# Patient Record
Sex: MALE | Race: WHITE | NOT HISPANIC OR LATINO | Employment: FULL TIME | URBAN - METROPOLITAN AREA
[De-identification: names, ages, dates, MRNs, and addresses within clinical notes are randomized per-mention and may not be internally consistent; named-entity substitution may affect disease eponyms.]

---

## 2017-09-09 ENCOUNTER — OFFICE VISIT (OUTPATIENT)
Dept: FAMILY MEDICINE | Facility: CLINIC | Age: 47
End: 2017-09-09
Payer: COMMERCIAL

## 2017-09-09 VITALS
HEIGHT: 62 IN | HEART RATE: 76 BPM | OXYGEN SATURATION: 98 % | TEMPERATURE: 99 F | SYSTOLIC BLOOD PRESSURE: 118 MMHG | WEIGHT: 158.19 LBS | BODY MASS INDEX: 29.11 KG/M2 | DIASTOLIC BLOOD PRESSURE: 70 MMHG

## 2017-09-09 DIAGNOSIS — G25.0 ESSENTIAL TREMOR: ICD-10-CM

## 2017-09-09 PROCEDURE — 99999 PR PBB SHADOW E&M-EST. PATIENT-LVL III: CPT | Mod: PBBFAC,,, | Performed by: NURSE PRACTITIONER

## 2017-09-09 PROCEDURE — 3008F BODY MASS INDEX DOCD: CPT | Mod: S$GLB,,, | Performed by: NURSE PRACTITIONER

## 2017-09-09 PROCEDURE — 99214 OFFICE O/P EST MOD 30 MIN: CPT | Mod: S$GLB,,, | Performed by: NURSE PRACTITIONER

## 2017-09-09 RX ORDER — PROPRANOLOL HYDROCHLORIDE 20 MG/1
TABLET ORAL
Qty: 90 TABLET | Refills: 0 | Status: SHIPPED | OUTPATIENT
Start: 2017-09-09 | End: 2017-09-22

## 2017-09-09 NOTE — PROGRESS NOTES
Subjective:       Patient ID: Randall Armstrong is a 47 y.o. male.    Chief Complaint: Tremors    HPI Mr Armstrong is here for B hand tremors.  This is a chronic problem, treated with Propanolol in the past with good relief, d/c'd medication when he lost his insurance.  Reports recent stressors r/t work and family.  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  He drinks on the weekends, he notes the tremors resolve after 3 beers.  Denies specifically dropping objects, but reports trouble holding objects due to tremors.  Review of Systems   Constitutional: Negative for fever.   Respiratory: Negative.    Cardiovascular: Negative.    Neurological: Positive for tremors.       Objective:      Physical Exam   Constitutional: He is oriented to person, place, and time. He appears well-developed and well-nourished. He does not appear ill. No distress.   HENT:   Head: Normocephalic and atraumatic.   Cardiovascular: Normal rate.    Pulses:       Radial pulses are 2+ on the right side, and 2+ on the left side.   Pulmonary/Chest: Effort normal.   Musculoskeletal: Normal range of motion.        Right hand: He exhibits normal range of motion and normal capillary refill. Normal sensation noted. Normal strength noted.        Left hand: He exhibits normal range of motion and normal capillary refill. Normal sensation noted. Normal strength noted.   Able to make a fist and a 5   Neurological: He is alert and oriented to person, place, and time.   Skin: Skin is warm and dry.   Vitals reviewed.      Assessment:       1. Essential tremor        Plan:       Essential tremor  -     propranolol (INDERAL) 20 MG tablet; Take 2 tablets in the morning and one at night  Dispense: 90 tablet; Refill: 0    Will restart propanolol, follow up made with PCP  Verbalized understanding

## 2017-09-09 NOTE — PATIENT INSTRUCTIONS
Follow up with primary care provider  Go to ER for new worse or concerning symptoms    Essential Tremor (ET)  What is essential tremor?  Essential tremor (ET) is a neurological disorder that causes your hands, head, trunk, voice, or legs to shake rhythmically. It is often confused with Parkinson disease.  ET is the most common trembling disorder that people have. Everyone has some ET, but the movements usually cannot be seen or felt. When tremors are noticeable, the condition is classified as ET.  ET is most common among people older than age 65, but it can affect people at any age.  What causes ET?  ET can occur in different people for different reasons:  · Familial essential tremor. In most people, the condition seems to be passed down from a parent to a child. If your parent has ET, there is a 50% chance that you or your children will inherit the gene responsible for the condition.  · Essential tremor related to another disorder. Sometimes, a tremor is a symptom of another neurological disorder, such as Parkinson disease or dystonia. Sometimes, ET is mistaken for these other diseases when they are not present. A healthcare providers careful diagnosis is extremely important.  The cause of ET isnt known. However, one theory suggests that your cerebellum and other parts of your brain are not communicating correctly. The cerebellum is a part of the brain that controls muscle coordination.  What are the symptoms of ET?  If you have ET, you will have shaking and trembling at different times and in different situations, but some characteristics are common to all. Here is what you might typically experience:  · Tremors occur when you move and are less noticeable when you rest.  · Certain medicines, caffeine, or stress can make your tremors worse.  · Tremor may improve with ingestion of a small amount of alcohol (such as wine)  · Tremors get worse as you age.  · Tremors dont affect both sides of your body in the same  way.  Here are different signs of essential tremor:  · Tremors that are most obvious in your hands  · Difficulty doing tasks with your hands, such as writing or using tools  · Shaking or quivering sound in your voice  · Uncontrollable head-nodding  · In rare instances, tremors in your legs or feet  How is ET diagnosed?  Your rapid, uncontrollable trembling, as well as questions about your medical and family history, can help your healthcare provider determine if you have familial ET. He or she will probably need to rule out other conditions that could cause shaking or trembling. For example, tremors could be symptoms of diseases, such as hyperthyroidism. Your healthcare provider might test you for those, as well.  In some cases, the tremors might be related to other factors. To find out for certain, your healthcare provider may have you try to:  · Abstain from heavy alcohol use; if youre an alcoholic, trembling is a common symptom.  · Cut out cigarette smoking.  · Avoid caffeine.  · Avoid certain medicines  How is ET treated?   Propanolol and primidone are 2 medicines often prescribed to treat ET. Propanolol blocks the stimulating action of neurotransmitters to calm your trembling. Primidone is a common antiseizure medicine that also controls the actions of neurotransmitters.  Gabapentin and topiramate are 2 other antiseizure medicines that are sometimes prescribed. In some cases, tranquilizers like alprazolam or clonazepam might be suggested.  For ET in your hands, botulinum toxin (Botox) injections have shown some promise in easing the trembling. They work by weakening the surrounding muscles around your hands. For severe tremors, a stimulating device (deep brain stimulator) surgically implanted in your brain may help.  Can ET be prevented?   The specific cause of ET is not known, so scientists are not sure how the condition can be prevented.  Living with ET  ET is usually not dangerous, but it can certainly be  frustrating if you have to deal with it. Certain factors can make tremors worse, so the following steps may help to decrease tremors:  · Avoid alcohol, cigarettes, and caffeine  · Avoid stressful situations as much as possible  · Use relaxation techniques, such as yoga, deep-breathing exercises, or biofeedback  · Check with your healthcare provider to see if any medicines youre taking could be making your tremors worse.  Talk with your healthcare provider about other options, such as surgery, if ET starts to affect your quality of life.  When should I call my healthcare provider?  If you have been diagnosed with ET, talk with your healthcare provider about when you might need to call. He or she will likely advise you to call if your tremors become worse, or if you develop new neurologic symptoms, such as numbness or weakness.  Key points about essential tremors  · ET is a neurological disorder that causes your hands, head, trunk, voice, or legs to shake rhythmically. The cause is not known, but it is often passed down from a parent to a child.  · ET is sometimes confused with other types of tremor, so getting the right diagnosis is important.  · Tremors tend to be worse during movement than when at rest. The tremors are usually not dangerous, but they can get worse over time.  · Avoiding things that might make tremors worse, such as stress, caffeine, and certain medicines, may be helpful.  · Medicines can also help control or limit tremors in some people. Severe tremors can sometimes be treated with surgery.  Next steps  Tips to help you get the most from a visit to your healthcare provider:  · Know the reason for your visit and what you want to happen.  · Before your visit, write down questions you want answered.  · Bring someone with you to help you ask questions and remember what your provider tells you.  · At the visit, write down the name of a new diagnosis, and any new medicines, treatments, or tests. Also  write down any new instructions your provider gives you.  · Know why a new medicine or treatment is prescribed, and how it will help you. Also know what the side effects are.  · Ask if your condition can be treated in other ways.  · Know why a test or procedure is recommended and what the results could mean.  · Know what to expect if you do not take the medicine or have the test or procedure.  · If you have a follow-up appointment, write down the date, time, and purpose for that visit.  · Know how you can contact your provider if you have questions.  © 3119-3182 [employer]. [address] [place] [address], [place] [address]. All rights reserved. This information is not intended as a substitute for professional medical care. Always follow your healthcare professional's instructions.

## 2017-09-22 ENCOUNTER — OFFICE VISIT (OUTPATIENT)
Dept: FAMILY MEDICINE | Facility: CLINIC | Age: 47
End: 2017-09-22
Payer: COMMERCIAL

## 2017-09-22 VITALS
OXYGEN SATURATION: 98 % | DIASTOLIC BLOOD PRESSURE: 74 MMHG | BODY MASS INDEX: 29.23 KG/M2 | SYSTOLIC BLOOD PRESSURE: 112 MMHG | WEIGHT: 159.81 LBS | HEART RATE: 64 BPM | RESPIRATION RATE: 18 BRPM | TEMPERATURE: 98 F

## 2017-09-22 DIAGNOSIS — F41.9 ANXIETY: ICD-10-CM

## 2017-09-22 DIAGNOSIS — G25.0 BENIGN ESSENTIAL TREMOR: Primary | ICD-10-CM

## 2017-09-22 PROCEDURE — 99999 PR PBB SHADOW E&M-EST. PATIENT-LVL III: CPT | Mod: PBBFAC,,, | Performed by: FAMILY MEDICINE

## 2017-09-22 PROCEDURE — 3008F BODY MASS INDEX DOCD: CPT | Mod: S$GLB,,, | Performed by: FAMILY MEDICINE

## 2017-09-22 PROCEDURE — 99214 OFFICE O/P EST MOD 30 MIN: CPT | Mod: S$GLB,,, | Performed by: FAMILY MEDICINE

## 2017-09-22 RX ORDER — ESCITALOPRAM OXALATE 5 MG/1
5 TABLET ORAL DAILY
Qty: 30 TABLET | Refills: 1 | Status: SHIPPED | OUTPATIENT
Start: 2017-09-22 | End: 2017-09-29

## 2017-09-22 RX ORDER — PROPRANOLOL HYDROCHLORIDE 80 MG/1
80 CAPSULE, EXTENDED RELEASE ORAL DAILY
Qty: 30 CAPSULE | Refills: 11 | Status: SHIPPED | OUTPATIENT
Start: 2017-09-22 | End: 2017-12-01

## 2017-09-27 ENCOUNTER — TELEPHONE (OUTPATIENT)
Dept: FAMILY MEDICINE | Facility: CLINIC | Age: 47
End: 2017-09-27

## 2017-09-27 NOTE — TELEPHONE ENCOUNTER
Please check with patient to see if tremors are worse all day or do they progressively worsen throughout the day    Thanks,  Dr. Blair

## 2017-09-27 NOTE — TELEPHONE ENCOUNTER
----- Message from Darcy Oliveira sent at 9/27/2017  1:28 PM CDT -----  Contact: SELF  Patient is calling to report his shaking is worse .    [phone] FD

## 2017-09-28 ENCOUNTER — TELEPHONE (OUTPATIENT)
Dept: FAMILY MEDICINE | Facility: CLINIC | Age: 47
End: 2017-09-28

## 2017-09-28 NOTE — TELEPHONE ENCOUNTER
Pt states he takes his meds @ 5:30 in the morning and around lunch time he starts to shake he states it's his whole body but his main concern is his hands because he uses them for work and it goes on until about 5:30 when he gets home.

## 2017-09-29 NOTE — PROGRESS NOTES
Routine Office Visit    Patient Name: Randall Armstrong    : 1970  MRN: [id number]    Subjective:  Randall is a 47 y.o. male who presents today for:    1. Tremor  Patient presenting today with continued essential tremor.  He has been taking more than prescribed of his propanolol.  He states that the 80mg he is taking is helping and he is able to do his work.  There has been no weakness or changes in walking.  He states that overall he feels fine, but is anxious about how this will effect his job as a .  He states that his family depends on his income and this causes him great anxiety.  He admits to feeling down on occasion, but relates it to being worried about finances.  He denies any SI or HI.      Past Medical History  History reviewed. No pertinent past medical history.    Past Surgical History  History reviewed. No pertinent surgical history.    Family History  History reviewed. No pertinent family history.    Social History  Social History     Social History    Marital status:      Spouse name: N/A    Number of children: N/A    Years of education: N/A     Occupational History    Not on file.     Social History Main Topics    Smoking status: Never Smoker    Smokeless tobacco: Never Used    Alcohol use Yes    Drug use: Unknown    Sexual activity: Not on file     Other Topics Concern    Not on file     Social History Narrative    No narrative on file       Current Medications  Current Outpatient Prescriptions on File Prior to Visit   Medication Sig Dispense Refill    fluticasone (FLONASE) 50 mcg/actuation nasal spray 1 spray by Each Nare route 2 (two) times daily. 16 g 1     No current facility-administered medications on file prior to visit.        Allergies   Review of patient's allergies indicates:  No Known Allergies    Review of Systems (Pertinent positives)  Review of Systems   Constitutional: Negative.    HENT: Negative.    Eyes: Negative.    Respiratory: Negative.     Cardiovascular: Negative.    Skin: Negative.    Neurological: Positive for tremors. Negative for focal weakness.   Psychiatric/Behavioral: The patient is nervous/anxious.          /74   Pulse 64   Temp 97.6 °F (36.4 °C)   Resp 18   Wt 72.5 kg (159 lb 13.3 oz)   SpO2 98%   BMI 29.23 kg/m²     GENERAL APPEARANCE: in no apparent distress and well developed and well nourished  HEENT: PERRL, EOMI, Sclera clear, anicteric, Oropharynx clear, no lesions, Neck supple with midline trachea  NECK: normal, supple, no adenopathy, thyroid normal in size  RESPIRATORY: appears well, vitals normal, no respiratory distress, acyanotic, normal RR, chest clear, no wheezing, crepitations, rhonchi, normal symmetric air entry  HEART: regular rate and rhythm, S1, S2 normal, no murmur, click, rub or gallop.    ABDOMEN: abdomen is soft without tenderness, no masses, no hernias, no organomegaly, no rebound, no guarding. Suprapubic tenderness absent. No CVA tenderness.  NEUROLOGIC: normal without focal findings, essential tremor in bilateral hands; no pill rolling; no cogwheeling  SKIN: no rashes, no wounds, no other lesions  PSYCH: Alert, oriented x 3, thought content appropriate, speech normal, pleasant and cooperative, good eye contact, well groomed.    Assessment/Plan:  Randall Armstrong is a 47 y.o. male who presents today for :    Diagnoses and all orders for this visit:    Benign essential tremor  -     propranolol (INDERAL LA) 80 MG 24 hr capsule; Take 1 capsule (80 mg total) by mouth once daily.    Anxiety  -     escitalopram oxalate (LEXAPRO) 5 MG Tab; Take 1 tablet (5 mg total) by mouth once daily.      1.  Patient to take medications as prescribed  2.  He is to let me know if symptoms worsen  3.  Follow up in 3 months or sooner if needed  4.  He is to call with any concerns    Iglesia Blair MD

## 2017-10-04 ENCOUNTER — HOSPITAL ENCOUNTER (OUTPATIENT)
Facility: HOSPITAL | Age: 47
Discharge: HOME | End: 2017-10-05
Attending: EMERGENCY MEDICINE | Admitting: EMERGENCY MEDICINE
Payer: COMMERCIAL

## 2017-10-04 ENCOUNTER — NURSE TRIAGE (OUTPATIENT)
Dept: ADMINISTRATIVE | Facility: CLINIC | Age: 47
End: 2017-10-04

## 2017-10-04 DIAGNOSIS — G25.0 BENIGN ESSENTIAL TREMOR: ICD-10-CM

## 2017-10-04 DIAGNOSIS — E78.2 MIXED HYPERLIPIDEMIA: ICD-10-CM

## 2017-10-04 DIAGNOSIS — M25.512 ACUTE PAIN OF LEFT SHOULDER: ICD-10-CM

## 2017-10-04 DIAGNOSIS — R07.9 CHEST PAIN, UNSPECIFIED TYPE: Primary | ICD-10-CM

## 2017-10-04 DIAGNOSIS — R07.9 CHEST PAIN: ICD-10-CM

## 2017-10-04 LAB
ALBUMIN SERPL BCP-MCNC: 4.2 G/DL
ALP SERPL-CCNC: 60 U/L
ALT SERPL W/O P-5'-P-CCNC: 24 U/L
ANION GAP SERPL CALC-SCNC: 9 MMOL/L
AST SERPL-CCNC: 24 U/L
BASOPHILS # BLD AUTO: 0.02 K/UL
BASOPHILS NFR BLD: 0.2 %
BILIRUB SERPL-MCNC: 0.6 MG/DL
BNP SERPL-MCNC: 33 PG/ML
BUN SERPL-MCNC: 14 MG/DL
CALCIUM SERPL-MCNC: 9.3 MG/DL
CHLORIDE SERPL-SCNC: 107 MMOL/L
CO2 SERPL-SCNC: 24 MMOL/L
CREAT SERPL-MCNC: 0.9 MG/DL
DIFFERENTIAL METHOD: ABNORMAL
EOSINOPHIL # BLD AUTO: 0.1 K/UL
EOSINOPHIL NFR BLD: 0.9 %
ERYTHROCYTE [DISTWIDTH] IN BLOOD BY AUTOMATED COUNT: 12.9 %
EST. GFR  (AFRICAN AMERICAN): >60 ML/MIN/1.73 M^2
EST. GFR  (NON AFRICAN AMERICAN): >60 ML/MIN/1.73 M^2
GLUCOSE SERPL-MCNC: 86 MG/DL
HCT VFR BLD AUTO: 38.8 %
HGB BLD-MCNC: 13.9 G/DL
LYMPHOCYTES # BLD AUTO: 2.7 K/UL
LYMPHOCYTES NFR BLD: 33.1 %
MCH RBC QN AUTO: 32.4 PG
MCHC RBC AUTO-ENTMCNC: 35.8 G/DL
MCV RBC AUTO: 90 FL
MONOCYTES # BLD AUTO: 0.5 K/UL
MONOCYTES NFR BLD: 6.6 %
NEUTROPHILS # BLD AUTO: 4.8 K/UL
NEUTROPHILS NFR BLD: 59.2 %
PLATELET # BLD AUTO: 235 K/UL
PMV BLD AUTO: 11.2 FL
POTASSIUM SERPL-SCNC: 3.9 MMOL/L
PROT SERPL-MCNC: 7.2 G/DL
RBC # BLD AUTO: 4.29 M/UL
SODIUM SERPL-SCNC: 140 MMOL/L
TROPONIN I SERPL DL<=0.01 NG/ML-MCNC: <0.006 NG/ML
TROPONIN I SERPL DL<=0.01 NG/ML-MCNC: <0.006 NG/ML
WBC # BLD AUTO: 8.07 K/UL

## 2017-10-04 PROCEDURE — 93005 ELECTROCARDIOGRAM TRACING: CPT

## 2017-10-04 PROCEDURE — 25000003 PHARM REV CODE 250: Performed by: EMERGENCY MEDICINE

## 2017-10-04 PROCEDURE — G0378 HOSPITAL OBSERVATION PER HR: HCPCS

## 2017-10-04 PROCEDURE — 84484 ASSAY OF TROPONIN QUANT: CPT

## 2017-10-04 PROCEDURE — 36000 PLACE NEEDLE IN VEIN: CPT

## 2017-10-04 PROCEDURE — 93010 ELECTROCARDIOGRAM REPORT: CPT | Mod: ,,, | Performed by: INTERNAL MEDICINE

## 2017-10-04 PROCEDURE — 83880 ASSAY OF NATRIURETIC PEPTIDE: CPT

## 2017-10-04 PROCEDURE — 80053 COMPREHEN METABOLIC PANEL: CPT

## 2017-10-04 PROCEDURE — 99284 EMERGENCY DEPT VISIT MOD MDM: CPT | Mod: 25

## 2017-10-04 PROCEDURE — 85025 COMPLETE CBC W/AUTO DIFF WBC: CPT

## 2017-10-04 RX ORDER — ONDANSETRON 2 MG/ML
4 INJECTION INTRAMUSCULAR; INTRAVENOUS EVERY 12 HOURS PRN
Status: DISCONTINUED | OUTPATIENT
Start: 2017-10-04 | End: 2017-10-05 | Stop reason: HOSPADM

## 2017-10-04 RX ORDER — AMOXICILLIN 250 MG
1 CAPSULE ORAL 2 TIMES DAILY
Status: DISCONTINUED | OUTPATIENT
Start: 2017-10-04 | End: 2017-10-05 | Stop reason: HOSPADM

## 2017-10-04 RX ORDER — ACETAMINOPHEN 325 MG/1
650 TABLET ORAL EVERY 8 HOURS PRN
Status: DISCONTINUED | OUTPATIENT
Start: 2017-10-04 | End: 2017-10-05 | Stop reason: HOSPADM

## 2017-10-04 RX ORDER — HYDROCODONE BITARTRATE AND ACETAMINOPHEN 10; 325 MG/1; MG/1
1 TABLET ORAL EVERY 4 HOURS PRN
Status: DISCONTINUED | OUTPATIENT
Start: 2017-10-04 | End: 2017-10-05 | Stop reason: HOSPADM

## 2017-10-04 RX ORDER — SODIUM CHLORIDE 0.9 % (FLUSH) 0.9 %
3 SYRINGE (ML) INJECTION EVERY 8 HOURS
Status: DISCONTINUED | OUTPATIENT
Start: 2017-10-04 | End: 2017-10-05 | Stop reason: HOSPADM

## 2017-10-04 RX ORDER — ASPIRIN 325 MG
325 TABLET ORAL DAILY
Status: DISCONTINUED | OUTPATIENT
Start: 2017-10-04 | End: 2017-10-04

## 2017-10-04 RX ORDER — KETOROLAC TROMETHAMINE 30 MG/ML
30 INJECTION, SOLUTION INTRAMUSCULAR; INTRAVENOUS EVERY 6 HOURS
Status: DISCONTINUED | OUTPATIENT
Start: 2017-10-05 | End: 2017-10-05 | Stop reason: HOSPADM

## 2017-10-04 RX ORDER — PROPRANOLOL HYDROCHLORIDE 80 MG/1
80 CAPSULE, EXTENDED RELEASE ORAL DAILY
Status: DISCONTINUED | OUTPATIENT
Start: 2017-10-05 | End: 2017-10-05 | Stop reason: HOSPADM

## 2017-10-04 RX ORDER — PANTOPRAZOLE SODIUM 40 MG/1
40 TABLET, DELAYED RELEASE ORAL DAILY
Status: DISCONTINUED | OUTPATIENT
Start: 2017-10-05 | End: 2017-10-05 | Stop reason: HOSPADM

## 2017-10-04 RX ORDER — ASPIRIN 325 MG
325 TABLET ORAL
Status: COMPLETED | OUTPATIENT
Start: 2017-10-04 | End: 2017-10-04

## 2017-10-04 RX ORDER — NAPROXEN SODIUM 220 MG/1
81 TABLET, FILM COATED ORAL DAILY
Status: DISCONTINUED | OUTPATIENT
Start: 2017-10-05 | End: 2017-10-05 | Stop reason: HOSPADM

## 2017-10-04 RX ORDER — KETOROLAC TROMETHAMINE 30 MG/ML
15 INJECTION, SOLUTION INTRAMUSCULAR; INTRAVENOUS EVERY 6 HOURS PRN
Status: DISCONTINUED | OUTPATIENT
Start: 2017-10-04 | End: 2017-10-04

## 2017-10-04 RX ADMIN — HYDROCODONE BITARTRATE AND ACETAMINOPHEN 1 TABLET: 10; 325 TABLET ORAL at 07:10

## 2017-10-04 RX ADMIN — ASPIRIN 325 MG ORAL TABLET 325 MG: 325 PILL ORAL at 01:10

## 2017-10-04 RX ADMIN — ASPIRIN 325 MG ORAL TABLET 325 MG: 325 PILL ORAL at 03:10

## 2017-10-04 NOTE — TELEPHONE ENCOUNTER
"    Reason for Disposition   Chest pain lasting longer than 5 minutes and ANY of the following:* Over 50 years old* Over 30 years old and at least one cardiac risk factor (i.e., high blood pressure, diabetes, high cholesterol, obesity, smoker or strong family history of heart disease)* Pain is crushing, pressure-like, or heavy * Took nitroglycerin and chest pain was not relieved* History of heart disease (i.e., angina, heart attack, bypass surgery, angioplasty, CHF)    Protocols used: ST CHEST PAIN-A-OH    Patient states that he started to feel pain in his chest at about 0930 this AM. He described the pain as feeling as though "someone was standing on his chest" and endorses radiation to the left arm and difficulty breathing during the episode. Patient states that he feels better now, but still has some pain. RN advised patient to call EMS, but patient stated that he will drive his own vehicle to the [facility] ED that is closest to him. I suggested that he have someone else take him. Patient verbalized understanding to care advice.  "

## 2017-10-04 NOTE — ED TRIAGE NOTES
Pt arrived to ED c/o chest pain rating as 5, describes as pressure while he was at work started this morning, pt took 2 pills of advil. Pt also reports SOB started this morning along with chest pain. Pt reports feeling headache yesterday and took 2 pills of advil. and pt  Pt is AAOx3, denies SOB, h/A  at this moment.

## 2017-10-04 NOTE — ASSESSMENT & PLAN NOTE
Admitted for rule out ACS.  Ischemic work-up (-) to date. EKG is non-ischemic. Initial troponin level negative. BNP wnl. Plan for continuous cardiac monitoring. Continue to trend serial troponins q6 hours X 2. ASA/NTG 0.4 mg SL PRN CP. NPO. Exercise nuclear stress. Cardiology consulted. ?poss MSK in mature.  Will provide NSAID trial x 3 doses.

## 2017-10-04 NOTE — SUBJECTIVE & OBJECTIVE
Past Medical History:   Diagnosis Date    Anxiety     Essential tremor        History reviewed. No pertinent surgical history.    Review of patient's allergies indicates:  No Known Allergies    No current facility-administered medications on file prior to encounter.      Current Outpatient Prescriptions on File Prior to Encounter   Medication Sig    propranolol (INDERAL LA) 80 MG 24 hr capsule Take 1 capsule (80 mg total) by mouth once daily.    fluticasone (FLONASE) 50 mcg/actuation nasal spray 1 spray by Each Nare route 2 (two) times daily.     Family History     Problem Relation (Age of Onset)    Cancer Mother    Heart disease Maternal Grandfather        Social History Main Topics    Smoking status: Never Smoker    Smokeless tobacco: Current User     Types: Chew    Alcohol use 3.0 - 3.6 oz/week     5 - 6 Cans of beer per week      Comment: on weekend only    Drug use: No    Sexual activity: Yes     Partners: Female     Review of Systems   Constitutional: Negative for chills, diaphoresis and fever.   HENT: Negative for congestion, postnasal drip, sinus pressure and sore throat.    Eyes: Negative for visual disturbance.   Respiratory: Negative for cough, chest tightness, shortness of breath and wheezing.    Cardiovascular: Positive for chest pain. Negative for palpitations and leg swelling.   Gastrointestinal: Negative for abdominal distention, abdominal pain, blood in stool, constipation, diarrhea, nausea and vomiting.   Endocrine: Negative.    Genitourinary: Negative for dysuria.   Musculoskeletal: Negative.         Left shoulder/arm pain   Skin: Negative.    Allergic/Immunologic: Negative.    Neurological: Negative for dizziness, weakness, numbness and headaches.   Hematological: Negative.    Psychiatric/Behavioral: Negative.      Objective:     Vital Signs (Most Recent):  Temp: 97.9 °F (36.6 °C) (10/04/17 1655)  Pulse: 72 (10/04/17 1655)  Resp: 18 (10/04/17 1655)  BP: 130/85 (10/04/17 1655)  SpO2: 97 %  (10/04/17 1655) Vital Signs (24h Range):  Temp:  [97.7 °F (36.5 °C)-98.4 °F (36.9 °C)] 97.9 °F (36.6 °C)  Pulse:  [54-72] 72  Resp:  [16-18] 18  SpO2:  [97 %-99 %] 97 %  BP: (115-131)/(78-87) 130/85     Weight: 75.1 kg (165 lb 9.1 oz)  Body mass index is 30.28 kg/m².    Physical Exam   Constitutional: He is oriented to person, place, and time. He appears well-developed and well-nourished. He is cooperative.  Non-toxic appearance. No distress.   HENT:   Head: Normocephalic and atraumatic.   Eyes: Conjunctivae and lids are normal. Pupils are equal, round, and reactive to light.   Neck: Trachea normal, normal range of motion and full passive range of motion without pain. Neck supple. Normal carotid pulses and no JVD present. No tracheal deviation present. No thyroid mass and no thyromegaly present.   Cardiovascular: Normal rate, regular rhythm, S1 normal, S2 normal, normal heart sounds and normal pulses.    Pulmonary/Chest: Effort normal and breath sounds normal. No stridor.   Abdominal: Soft. Normal appearance and bowel sounds are normal. There is no tenderness.   Musculoskeletal: Normal range of motion.   Neurological: He is alert and oriented to person, place, and time. He has normal strength. No cranial nerve deficit or sensory deficit.   Skin: Skin is warm, dry and intact. He is not diaphoretic. No cyanosis. Nails show no clubbing.   Psychiatric: He has a normal mood and affect. His speech is normal and behavior is normal. Judgment and thought content normal. Cognition and memory are normal.        Significant Labs:   CBC:   Recent Labs  Lab 10/04/17  1323   WBC 8.07   HGB 13.9*   HCT 38.8*        CMP:   Recent Labs  Lab 10/04/17  1323      K 3.9      CO2 24   GLU 86   BUN 14   CREATININE 0.9   CALCIUM 9.3   PROT 7.2   ALBUMIN 4.2   BILITOT 0.6   ALKPHOS 60   AST 24   ALT 24   ANIONGAP 9   EGFRNONAA >60     Cardiac Markers:   Recent Labs  Lab 10/04/17  1323   BNP 33     Troponin:   Recent  Labs  Lab 10/04/17  1323 10/04/17  1625   TROPONINI <0.006 <0.006     Significant Imaging:   Imaging Results          X-Ray Chest PA And Lateral (Final result)  Result time 10/04/17 13:35:50    Final result by Ten Downing MD (10/04/17 13:35:50)                 Impression:     No acute cardiopulmonary process.          Electronically signed by: TEN DOWNING MD  Date:     10/04/17  Time:    13:35              Narrative:    Chest PA and lateral    Indication:Chest pain    Comparison:None    Findings:  The cardiomediastinal silhouette is not enlarged.  There is no pleural effusion.  The trachea is midline.  The lungs are symmetrically expanded bilaterally with coarse interstitial attenuation. No large focal consolidation seen.  There is no pneumothorax.  The osseous structures are unremarkable.

## 2017-10-04 NOTE — HPI
"Randall Armstrong is a 47 y.o. male with a history as below who presented to the ED with a CC of mid-sternal chest pain with associated left shoulder numbness. He reports he went to work this AM, developed a generalized HA ~ 0900, took advil without relief. States ~ 0930 he developed mid-sternal "stabbing/feel like someone sitting in my chest" chest pain 5/10. Reports the pain is continuous however, "eases up".  He reports the left upper arm/shoulder pain he reports "feeling like someone is squeezing my arm". Does not know mother/father history. Maternal grandmother (d) heart failure.  Found in ED to have non-ischemic EKG.  Initial troponin negative. BNP wnl. CXR is non-revealing.  Admitted to [facility]BS for ACS rule out.  No previous cardiac issues or recent w/u. He denies recurrent CP and is non-distressed.                                      "

## 2017-10-04 NOTE — ED PROVIDER NOTES
"Encounter Date: 10/4/2017    SCRIBE #1 NOTE: I, Angelina Mendoza, am scribing for, and in the presence of,  Paul Leal MD. I have scribed the following portions of the note - Other sections scribed: HPI and ROS.       History     Chief Complaint   Patient presents with    Chest Pain     Patient c/o squeezing CP this morning while working this morning. Now c/o numbness in his left shoulder radiating down his left arm.      Chief Complaint: Chest Pain    HPI: This 47 y.o. Male with no known PMHx presents to the ED c/o acute onset mis sternal chest pain. Symptoms began while working this morning. Pain is constant, severe and described as a squeezing sensation. There's associated left shoulder "tightness" that radiates down to the left elbow. Patient also reports mild SOB. There's no attempted treatment. Symptoms have since improved since onset. He denies fever, chills, diaphoresis, nausea or vomiting.       The history is provided by the patient. No  was used.     Review of patient's allergies indicates:  No Known Allergies  Past Medical History:   Diagnosis Date    Anxiety     Essential tremor      History reviewed. No pertinent surgical history.  Family History   Problem Relation Age of Onset    Cancer Mother      breast    Heart disease Maternal Grandfather      Social History   Substance Use Topics    Smoking status: Never Smoker    Smokeless tobacco: Current User     Types: Chew    Alcohol use 3.0 - 3.6 oz/week     5 - 6 Cans of beer per week      Comment: on weekend only     Review of Systems   Constitutional: Negative for chills and fever.   HENT: Negative for ear pain and sore throat.    Eyes: Negative for pain.   Respiratory: Negative for cough and shortness of breath.    Cardiovascular: Positive for chest pain.   Gastrointestinal: Negative for abdominal pain, diarrhea, nausea and vomiting.   Genitourinary: Negative for dysuria and flank pain.   Musculoskeletal: Negative for " myalgias (arm or leg pain).   Skin: Negative for rash.   Neurological: Negative for dizziness, syncope, numbness and headaches.   Psychiatric/Behavioral: Negative for confusion.       Physical Exam     Initial Vitals [10/04/17 1147]   BP Pulse Resp Temp SpO2   129/86 60 16 97.8 °F (36.6 °C) 98 %      MAP       100.33         Physical Exam    Constitutional: He appears well-developed and well-nourished. He is not diaphoretic. No distress.   HENT:   Head: Normocephalic and atraumatic.   Eyes: Conjunctivae and EOM are normal. Pupils are equal, round, and reactive to light. No scleral icterus.   Neck: Normal range of motion. Neck supple.   Cardiovascular: Normal rate, regular rhythm, normal heart sounds and intact distal pulses. Exam reveals no gallop and no friction rub.    No murmur heard.  Pulmonary/Chest: Breath sounds normal. No stridor. No respiratory distress. He has no wheezes. He has no rhonchi. He has no rales.   Abdominal: Soft. Bowel sounds are normal. He exhibits no distension. There is no tenderness. There is no rebound and no guarding.   Musculoskeletal: Normal range of motion. He exhibits no edema or tenderness.   Neurological: He is alert and oriented to person, place, and time. He has normal strength. No cranial nerve deficit.   Skin: Skin is warm and dry. No rash noted.   Psychiatric: He has a normal mood and affect. His behavior is normal.         ED Course   Procedures  Labs Reviewed   CBC W/ AUTO DIFFERENTIAL - Abnormal; Notable for the following:        Result Value    RBC 4.29 (*)     Hemoglobin 13.9 (*)     Hematocrit 38.8 (*)     MCH 32.4 (*)     All other components within normal limits   COMPREHENSIVE METABOLIC PANEL   TROPONIN I   B-TYPE NATRIURETIC PEPTIDE   TROPONIN I     EKG Readings: (Independently Interpreted)   Initial Reading: No STEMI. Rhythm: Sinus Bradycardia. Heart Rate: 56.          Medical Decision Making:   Initial Assessment:   48 yo male with Hx concerning for ACS. Initial  trop, EKG, and exam reassuring. Also possible is GERD, gastritis, though larger concern would be for missed ACS. Will admit for obs and further evaluation and management including stress test and cards eval. Patient stable in Ed. Given ASA.             Scribe Attestation:   Scribe #1: I performed the above scribed service and the documentation accurately describes the services I performed. I attest to the accuracy of the note.    Attending Attestation:           Physician Attestation for Scribe:  Physician Attestation Statement for Scribe #1: I, Paul Leal MD, reviewed documentation, as scribed by Angelina Mendoza in my presence, and it is both accurate and complete.                 ED Course      Clinical Impression:   The primary encounter diagnosis was Chest pain, unspecified type. Diagnoses of Chest pain, Mixed hyperlipidemia, Benign essential tremor, and Acute pain of left shoulder were also pertinent to this visit.    Disposition:   Disposition: Placed in Observation  Condition: Stable                        Paul Leal MD  10/18/17 6889

## 2017-10-04 NOTE — H&P
"[facility] Hospital Medicine  History & Physical    Patient Name: Randall Armstrong  MRN: [id number]  Admission Date: 10/4/2017  Attending Physician: Davida Estrada MD   Primary Care Provider: Den Moore MD         Patient information was obtained from patient and ER records.     Subjective:     Principal Problem:<principal problem not specified>    Chief Complaint:   Chief Complaint   Patient presents with    Chest Pain     Patient c/o squeezing CP this morning while working this morning. Now c/o numbness in his left shoulder radiating down his left arm.         HPI: Randall Armstrong is a 47 y.o. male with a history as below who presented to the ED with a CC of mid-sternal chest pain with associated left shoulder numbness. He reports he went to work this AM, developed a generalized HA ~ 0900, took advil without relief. States ~ 0930 he developed mid-sternal "stabbing/feel like someone sitting in my chest" chest pain 5/10. Reports the pain is continuous however, "eases up".  He reports the left upper arm/shoulder pain he reports "feeling like someone is squeezing my arm". Does not know mother/father history. Maternal grandmother (d) heart failure.  Found in ED to have non-ischemic EKG.  Initial troponin negative. BNP wnl. CXR is non-revealing.  Admitted to OBS for ACS rule out.  No previous cardiac issues or recent w/u. He denies recurrent CP and is non-distressed.                                        Past Medical History:   Diagnosis Date    Anxiety     Essential tremor        History reviewed. No pertinent surgical history.    Review of patient's allergies indicates:  No Known Allergies    No current facility-administered medications on file prior to encounter.      Current Outpatient Prescriptions on File Prior to Encounter   Medication Sig    propranolol (INDERAL LA) 80 MG 24 hr capsule Take 1 capsule (80 mg total) by mouth once daily.    fluticasone (FLONASE) 50 mcg/actuation " nasal spray 1 spray by Each Nare route 2 (two) times daily.     Family History     Problem Relation (Age of Onset)    Cancer Mother    Heart disease Maternal Grandfather        Social History Main Topics    Smoking status: Never Smoker    Smokeless tobacco: Current User     Types: Chew    Alcohol use 3.0 - 3.6 oz/week     5 - 6 Cans of beer per week      Comment: on weekend only    Drug use: No    Sexual activity: Yes     Partners: Female     Review of Systems   Constitutional: Negative for chills, diaphoresis and fever.   HENT: Negative for congestion, postnasal drip, sinus pressure and sore throat.    Eyes: Negative for visual disturbance.   Respiratory: Negative for cough, chest tightness, shortness of breath and wheezing.    Cardiovascular: Positive for chest pain. Negative for palpitations and leg swelling.   Gastrointestinal: Negative for abdominal distention, abdominal pain, blood in stool, constipation, diarrhea, nausea and vomiting.   Endocrine: Negative.    Genitourinary: Negative for dysuria.   Musculoskeletal: Negative.         Left shoulder/arm pain   Skin: Negative.    Allergic/Immunologic: Negative.    Neurological: Negative for dizziness, weakness, numbness and headaches.   Hematological: Negative.    Psychiatric/Behavioral: Negative.      Objective:     Vital Signs (Most Recent):  Temp: 97.9 °F (36.6 °C) (10/04/17 1655)  Pulse: 72 (10/04/17 1655)  Resp: 18 (10/04/17 1655)  BP: 130/85 (10/04/17 1655)  SpO2: 97 % (10/04/17 1655) Vital Signs (24h Range):  Temp:  [97.7 °F (36.5 °C)-98.4 °F (36.9 °C)] 97.9 °F (36.6 °C)  Pulse:  [54-72] 72  Resp:  [16-18] 18  SpO2:  [97 %-99 %] 97 %  BP: (115-131)/(78-87) 130/85     Weight: 75.1 kg (165 lb 9.1 oz)  Body mass index is 30.28 kg/m².    Physical Exam   Constitutional: He is oriented to person, place, and time. He appears well-developed and well-nourished. He is cooperative.  Non-toxic appearance. No distress.   HENT:   Head: Normocephalic and atraumatic.    Eyes: Conjunctivae and lids are normal. Pupils are equal, round, and reactive to light.   Neck: Trachea normal, normal range of motion and full passive range of motion without pain. Neck supple. Normal carotid pulses and no JVD present. No tracheal deviation present. No thyroid mass and no thyromegaly present.   Cardiovascular: Normal rate, regular rhythm, S1 normal, S2 normal, normal heart sounds and normal pulses.    Pulmonary/Chest: Effort normal and breath sounds normal. No stridor.   Abdominal: Soft. Normal appearance and bowel sounds are normal. There is no tenderness.   Musculoskeletal: Normal range of motion.   Neurological: He is alert and oriented to person, place, and time. He has normal strength. No cranial nerve deficit or sensory deficit.   Skin: Skin is warm, dry and intact. He is not diaphoretic. No cyanosis. Nails show no clubbing.   Psychiatric: He has a normal mood and affect. His speech is normal and behavior is normal. Judgment and thought content normal. Cognition and memory are normal.        Significant Labs:   CBC:   Recent Labs  Lab 10/04/17  1323   WBC 8.07   HGB 13.9*   HCT 38.8*        CMP:   Recent Labs  Lab 10/04/17  1323      K 3.9      CO2 24   GLU 86   BUN 14   CREATININE 0.9   CALCIUM 9.3   PROT 7.2   ALBUMIN 4.2   BILITOT 0.6   ALKPHOS 60   AST 24   ALT 24   ANIONGAP 9   EGFRNONAA >60     Cardiac Markers:   Recent Labs  Lab 10/04/17  1323   BNP 33     Troponin:   Recent Labs  Lab 10/04/17  1323 10/04/17  1625   TROPONINI <0.006 <0.006     Significant Imaging:   Imaging Results          X-Ray Chest PA And Lateral (Final result)  Result time 10/04/17 13:35:50    Final result by Ten Downing MD (10/04/17 13:35:50)                 Impression:     No acute cardiopulmonary process.          Electronically signed by: TEN DOWNING MD  Date:     10/04/17  Time:    13:35              Narrative:    Chest PA and lateral    Indication:Chest  pain    Comparison:None    Findings:  The cardiomediastinal silhouette is not enlarged.  There is no pleural effusion.  The trachea is midline.  The lungs are symmetrically expanded bilaterally with coarse interstitial attenuation. No large focal consolidation seen.  There is no pneumothorax.  The osseous structures are unremarkable.                                Assessment/Plan:     Chest pain    Admitted for rule out ACS.  Ischemic work-up (-) to date. EKG is non-ischemic. Initial troponin level negative. BNP wnl. Plan for continuous cardiac monitoring. Continue to trend serial troponins q6 hours X 2. ASA/NTG 0.4 mg SL PRN CP. NPO. Exercise nuclear stress. Cardiology consulted. ?poss MSK in mature.  Will provide NSAID trial x 3 doses.            Benign essential tremor    Continue propanolol            VTE Risk Mitigation         Ordered     Low Risk of VTE  Once      10/04/17 1804             ONESIMO Xie  Department of Hospital Medicine   [facility]

## 2017-10-04 NOTE — NURSING
Pt arrive to the unit by wheelchair accompanied by ED staff. Assisted pt to bed. Tele monitoring initiated.  Admit assessment initiated.  Pt is AAOx4.  No distress noted.

## 2017-10-04 NOTE — ED NOTES
Pt requested for a tray. Dr. Leal notified and verbalized adult regular diet for pt. Dietary called and verbalized understanding.

## 2017-10-05 VITALS
RESPIRATION RATE: 16 BRPM | WEIGHT: 166 LBS | BODY MASS INDEX: 30.55 KG/M2 | HEART RATE: 58 BPM | HEIGHT: 62 IN | OXYGEN SATURATION: 97 % | DIASTOLIC BLOOD PRESSURE: 84 MMHG | SYSTOLIC BLOOD PRESSURE: 140 MMHG | TEMPERATURE: 98 F

## 2017-10-05 LAB
ALBUMIN SERPL BCP-MCNC: 3.6 G/DL
ALP SERPL-CCNC: 56 U/L
ALT SERPL W/O P-5'-P-CCNC: 19 U/L
ANION GAP SERPL CALC-SCNC: 11 MMOL/L
AST SERPL-CCNC: 19 U/L
BASOPHILS # BLD AUTO: 0.03 K/UL
BASOPHILS NFR BLD: 0.4 %
BILIRUB SERPL-MCNC: 0.2 MG/DL
BUN SERPL-MCNC: 22 MG/DL
CALCIUM SERPL-MCNC: 9.1 MG/DL
CHLORIDE SERPL-SCNC: 108 MMOL/L
CHOLEST SERPL-MCNC: 208 MG/DL
CHOLEST/HDLC SERPL: 7.7 {RATIO}
CO2 SERPL-SCNC: 21 MMOL/L
CREAT SERPL-MCNC: 0.9 MG/DL
DIASTOLIC DYSFUNCTION: NO
DIFFERENTIAL METHOD: ABNORMAL
EOSINOPHIL # BLD AUTO: 0.1 K/UL
EOSINOPHIL NFR BLD: 1.4 %
ERYTHROCYTE [DISTWIDTH] IN BLOOD BY AUTOMATED COUNT: 12.9 %
EST. GFR  (AFRICAN AMERICAN): >60 ML/MIN/1.73 M^2
EST. GFR  (NON AFRICAN AMERICAN): >60 ML/MIN/1.73 M^2
ESTIMATED PA SYSTOLIC PRESSURE: 26.62
GLUCOSE SERPL-MCNC: 124 MG/DL
HCT VFR BLD AUTO: 38.4 %
HDLC SERPL-MCNC: 27 MG/DL
HDLC SERPL: 13 %
HGB BLD-MCNC: 13.3 G/DL
LDLC SERPL CALC-MCNC: ABNORMAL MG/DL
LYMPHOCYTES # BLD AUTO: 3 K/UL
LYMPHOCYTES NFR BLD: 38.5 %
MCH RBC QN AUTO: 32.8 PG
MCHC RBC AUTO-ENTMCNC: 34.6 G/DL
MCV RBC AUTO: 95 FL
MITRAL VALVE REGURGITATION: NORMAL
MONOCYTES # BLD AUTO: 0.8 K/UL
MONOCYTES NFR BLD: 9.6 %
NEUTROPHILS # BLD AUTO: 3.9 K/UL
NEUTROPHILS NFR BLD: 50 %
NONHDLC SERPL-MCNC: 181 MG/DL
PLATELET # BLD AUTO: 216 K/UL
PMV BLD AUTO: 12.3 FL
POTASSIUM SERPL-SCNC: 3.9 MMOL/L
PROT SERPL-MCNC: 6.6 G/DL
RBC # BLD AUTO: 4.06 M/UL
RETIRED EF AND QEF - SEE NOTES: 55 (ref 55–65)
SODIUM SERPL-SCNC: 140 MMOL/L
TRICUSPID VALVE REGURGITATION: NORMAL
TRIGL SERPL-MCNC: 570 MG/DL
TSH SERPL DL<=0.005 MIU/L-ACNC: 3.22 UIU/ML
WBC # BLD AUTO: 7.85 K/UL

## 2017-10-05 PROCEDURE — A4216 STERILE WATER/SALINE, 10 ML: HCPCS | Performed by: EMERGENCY MEDICINE

## 2017-10-05 PROCEDURE — 80061 LIPID PANEL: CPT

## 2017-10-05 PROCEDURE — 93306 TTE W/DOPPLER COMPLETE: CPT

## 2017-10-05 PROCEDURE — 93016 CV STRESS TEST SUPVJ ONLY: CPT | Mod: ,,, | Performed by: INTERNAL MEDICINE

## 2017-10-05 PROCEDURE — 93018 CV STRESS TEST I&R ONLY: CPT | Mod: ,,, | Performed by: INTERNAL MEDICINE

## 2017-10-05 PROCEDURE — 84443 ASSAY THYROID STIM HORMONE: CPT

## 2017-10-05 PROCEDURE — 78452 HT MUSCLE IMAGE SPECT MULT: CPT | Mod: 26,,, | Performed by: INTERNAL MEDICINE

## 2017-10-05 PROCEDURE — 85025 COMPLETE CBC W/AUTO DIFF WBC: CPT

## 2017-10-05 PROCEDURE — G0378 HOSPITAL OBSERVATION PER HR: HCPCS

## 2017-10-05 PROCEDURE — 63600175 PHARM REV CODE 636 W HCPCS: Performed by: NURSE PRACTITIONER

## 2017-10-05 PROCEDURE — 80053 COMPREHEN METABOLIC PANEL: CPT

## 2017-10-05 PROCEDURE — 93306 TTE W/DOPPLER COMPLETE: CPT | Mod: 26,,, | Performed by: INTERNAL MEDICINE

## 2017-10-05 PROCEDURE — 25000003 PHARM REV CODE 250: Performed by: EMERGENCY MEDICINE

## 2017-10-05 PROCEDURE — 25000003 PHARM REV CODE 250: Performed by: NURSE PRACTITIONER

## 2017-10-05 PROCEDURE — 36415 COLL VENOUS BLD VENIPUNCTURE: CPT

## 2017-10-05 PROCEDURE — 63600175 PHARM REV CODE 636 W HCPCS

## 2017-10-05 PROCEDURE — 94761 N-INVAS EAR/PLS OXIMETRY MLT: CPT

## 2017-10-05 PROCEDURE — 99219 PR INITIAL OBSERVATION CARE,LEVL II: CPT | Mod: ,,, | Performed by: INTERNAL MEDICINE

## 2017-10-05 PROCEDURE — 93017 CV STRESS TEST TRACING ONLY: CPT

## 2017-10-05 RX ORDER — REGADENOSON 0.08 MG/ML
INJECTION, SOLUTION INTRAVENOUS
Status: DISCONTINUED
Start: 2017-10-05 | End: 2017-10-05 | Stop reason: HOSPADM

## 2017-10-05 RX ORDER — NAPROXEN SODIUM 220 MG/1
81 TABLET, FILM COATED ORAL DAILY
Refills: 0 | COMMUNITY
Start: 2017-10-06 | End: 2018-10-06

## 2017-10-05 RX ORDER — ATORVASTATIN CALCIUM 40 MG/1
40 TABLET, FILM COATED ORAL DAILY
Qty: 90 TABLET | Refills: 3 | Status: SHIPPED | OUTPATIENT
Start: 2017-10-06 | End: 2018-10-06

## 2017-10-05 RX ORDER — ATORVASTATIN CALCIUM 40 MG/1
40 TABLET, FILM COATED ORAL DAILY
Status: DISCONTINUED | OUTPATIENT
Start: 2017-10-05 | End: 2017-10-05 | Stop reason: HOSPADM

## 2017-10-05 RX ADMIN — Medication 3 ML: at 06:10

## 2017-10-05 RX ADMIN — PANTOPRAZOLE SODIUM 40 MG: 40 TABLET, DELAYED RELEASE ORAL at 08:10

## 2017-10-05 RX ADMIN — KETOROLAC TROMETHAMINE 30 MG: 30 INJECTION, SOLUTION INTRAMUSCULAR at 06:10

## 2017-10-05 RX ADMIN — ATORVASTATIN CALCIUM 40 MG: 40 TABLET, FILM COATED ORAL at 01:10

## 2017-10-05 RX ADMIN — KETOROLAC TROMETHAMINE 30 MG: 30 INJECTION, SOLUTION INTRAMUSCULAR at 01:10

## 2017-10-05 RX ADMIN — ASPIRIN 81 MG 81 MG: 81 TABLET ORAL at 08:10

## 2017-10-05 NOTE — PLAN OF CARE
10/05/17 1357   Final Note   Assessment Type Final Discharge Note   Discharge Disposition Home   Hospital Follow Up  Appt(s) scheduled? Yes   Discharge plans and expectations educations in teach back method with documentation complete? Yes   Right Care Referral Info   Post Acute Recommendation No Care   notified pts nurse Amos that she may proceed with nursing d/c instructions to complete d/c process

## 2017-10-05 NOTE — HPI
"HPI: Randall Armstrong is a 47 y.o. male with a history as below who presented to the ED with a CC of mid-sternal chest pain with associated left shoulder numbness. He reports he went to work this AM, developed a generalized HA ~ 0900, took advil without relief. States ~ 0930 he developed mid-sternal "stabbing/feel like someone sitting in my chest" chest pain 5/10. Reports the pain is continuous however, "eases up".  He reports the left upper arm/shoulder pain he reports "feeling like someone is squeezing my arm". Does not know mother/father history. Maternal grandmother (d) heart failure.  Found in ED to have non-ischemic EKG.  Initial troponin negative. BNP wnl. CXR is non-revealing.  Admitted to OBS for ACS rule out.  No previous cardiac issues or recent w/u. He denies recurrent CP and is non-distressed.          Denies prior CAD  Currently CP free. Denies SOB  EKG sinus bradycardia 56 otherwise ok                 "

## 2017-10-05 NOTE — CONSULTS
"  [facility]  Cardiology  Consult Note    Patient Name: Randall Armstrong  MRN: [id number]  Admission Date: 10/4/2017  Hospital Length of Stay: 0 days  Code Status: Full Code   Attending Provider: Davida Estrada MD   Consulting Provider: Michele Gonzalez MD  Primary Care Physician: Den Moore MD  Principal Problem:<principal problem not specified>    Patient information was obtained from patient and ER records.     Consults  Subjective:     Chief Complaint:  CP     HPI:   HPI: Randall Armstrong is a 47 y.o. male with a history as below who presented to the ED with a CC of mid-sternal chest pain with associated left shoulder numbness. He reports he went to work this AM, developed a generalized HA ~ 0900, took advil without relief. States ~ 0930 he developed mid-sternal "stabbing/feel like someone sitting in my chest" chest pain 5/10. Reports the pain is continuous however, "eases up".  He reports the left upper arm/shoulder pain he reports "feeling like someone is squeezing my arm". Does not know mother/father history. Maternal grandmother (d) heart failure.  Found in ED to have non-ischemic EKG.  Initial troponin negative. BNP wnl. CXR is non-revealing.  Admitted to OBS for ACS rule out.  No previous cardiac issues or recent w/u. He denies recurrent CP and is non-distressed.          Denies prior CAD  Currently CP free. Denies SOB  EKG sinus bradycardia 56 otherwise ok                    Past Medical History:   Diagnosis Date    Anxiety     Essential tremor        History reviewed. No pertinent surgical history.    Review of patient's allergies indicates:  No Known Allergies    No current facility-administered medications on file prior to encounter.      Current Outpatient Prescriptions on File Prior to Encounter   Medication Sig    propranolol (INDERAL LA) 80 MG 24 hr capsule Take 1 capsule (80 mg total) by mouth once daily.    fluticasone (FLONASE) 50 mcg/actuation nasal spray 1 spray by " Each Nare route 2 (two) times daily.     Family History     Problem Relation (Age of Onset)    Cancer Mother    Heart disease Maternal Grandfather        Social History Main Topics    Smoking status: Never Smoker    Smokeless tobacco: Current User     Types: Chew    Alcohol use 3.0 - 3.6 oz/week     5 - 6 Cans of beer per week      Comment: on weekend only    Drug use: No    Sexual activity: Yes     Partners: Female     Review of Systems   Constitution: Negative for decreased appetite.   HENT: Negative for ear discharge.    Eyes: Negative for blurred vision.   Endocrine: Negative for polyphagia.   Skin: Negative for nail changes.   Neurological: Negative for aphonia.   Psychiatric/Behavioral: Negative for hallucinations.     Objective:     Vital Signs (Most Recent):  Temp: 98.1 °F (36.7 °C) (10/05/17 0748)  Pulse: (!) 58 (10/05/17 0748)  Resp: 18 (10/05/17 0748)  BP: 105/66 (10/05/17 0748)  SpO2: 97 % (10/05/17 0748) Vital Signs (24h Range):  Temp:  [97.6 °F (36.4 °C)-98.7 °F (37.1 °C)] 98.1 °F (36.7 °C)  Pulse:  [54-72] 58  Resp:  [16-18] 18  SpO2:  [95 %-99 %] 97 %  BP: (105-131)/(55-87) 105/66     Weight: 75.3 kg (166 lb 0.1 oz)  Body mass index is 30.36 kg/m².    SpO2: 97 %  O2 Device (Oxygen Therapy): room air      Intake/Output Summary (Last 24 hours) at 10/05/17 1032  Last data filed at 10/04/17 2300   Gross per 24 hour   Intake              120 ml   Output                0 ml   Net              120 ml       Lines/Drains/Airways     Peripheral Intravenous Line                 Peripheral IV - Single Lumen 10/04/17 1201 Right Antecubital less than 1 day                Physical Exam   Constitutional: He is oriented to person, place, and time. He appears well-developed and well-nourished.   HENT:   Head: Normocephalic and atraumatic.   Eyes: Conjunctivae are normal. Pupils are equal, round, and reactive to light.   Neck: Normal range of motion. Neck supple.   Cardiovascular: Normal rate, normal heart sounds  and intact distal pulses.    Pulmonary/Chest: Effort normal and breath sounds normal.   Abdominal: Soft. Bowel sounds are normal.   Musculoskeletal: Normal range of motion.   Neurological: He is alert and oriented to person, place, and time.   Skin: Skin is warm and dry.       Significant Labs: All pertinent lab results from the last 24 hours have been reviewed.    Significant Imaging: X-Ray: CXR: X-Ray Chest 1 View (CXR): No results found for this visit on 10/04/17.    Assessment and Plan:     Chest pain    Ruled out for MI. Echo and stress test today - ok for d/c if unremarkable            VTE Risk Mitigation         Ordered     Low Risk of VTE  Once      10/04/17 5710          Thank you for your consult. I will follow-up with patient. Please contact us if you have any additional questions.    Michele Gonzalez MD  Cardiology   [facility]

## 2017-10-05 NOTE — PLAN OF CARE
Problem: Fall Risk (Adult)  Intervention: Patient Rounds   10/05/17 0119   Safety Interventions   Patient Rounds bed in low position;bed wheels locked;call light in reach;clutter free environment maintained;ID band on;placement of personal items at bedside;toileting offered;visualized patient         Problem: Patient Care Overview  Goal: Plan of Care Review   10/05/17 0215   Coping/Psychosocial   Plan Of Care Reviewed With patient   Patient updated on plan of care and verbalized understanding.     Problem: Pain, Acute (Adult)  Goal: Acceptable Pain Control/Comfort Level  Patient will demonstrate the desired outcomes by discharge/transition of care.   Patient denies chest pain or shortness of breath overnight. Troponin negative x 2. Patient is NPO pending cardiac workup. 2D echo ordered.

## 2017-10-05 NOTE — PLAN OF CARE
10/05/17 1020   Discharge Assessment   Assessment Type Discharge Planning Assessment   Confirmed/corrected address and phone number on facesheet? Yes   Assessment information obtained from? Patient   Prior to hospitilization cognitive status: Alert/Oriented   Prior to hospitalization functional status: Independent   Current cognitive status: Alert/Oriented   Current Functional Status: Independent   Lives With spouse   Able to Return to Prior Arrangements yes   Is patient able to care for self after discharge? Yes   Who are your caregiver(s) and their phone number(s)? spouse   Patient's perception of discharge disposition home or selfcare   Readmission Within The Last 30 Days no previous admission in last 30 days   Patient currently being followed by outpatient case management? No   Patient currently receives any other outside agency services? No   Equipment Currently Used at Home none   Do you have any problems affording any of your prescribed medications? No   Is the patient taking medications as prescribed? yes   Does the patient have transportation home? Yes   Transportation Available car;family or friend will provide   Does the patient receive services at the [facility]? No   Discharge Plan A Home with family   Patient/Family In Agreement With Plan yes     [facility] - [place], [place] - [address] [address]  [address] [address]  Phone: [phone] Fax: [phone]

## 2017-10-05 NOTE — PLAN OF CARE
10/05/2017      Randall Armstrong  [address]  [facility] [address]       [facility]  Department of Hospital Medicine  [address] [place] [address]  [place] [address] [phone] fax      Randall Armstrong has been hospitalized at the [facility] since 10/4/2017.  Please excuse the patient from duties.  Patient may return on 10/06/2017.  No restrictions.     Please contact me if you have any questions.                  __________________________  ONESIMO Xie  10/05/2017

## 2017-10-05 NOTE — NURSING
Report received from EM Mccoy. Visualized patient and assessed patient's overall condition and appearance. NAD noted. Will continue to monitor.

## 2017-10-05 NOTE — HOSPITAL COURSE
Patient admitted for chest pain and ruled out ACS.  EKG is non ischemic. Serial troponin levels normal x 3. BNP normal. CXR without acute cardiothoracic processes. Echocardiogram performed and show EF 55%. NST performed and is without evidence of myocardial ischemia. Cardiology consulted and after evaluation has cleared the patient for discharge from a CV standpoint. Chest pain likely atypical in nature. Of note, found to have HLD, started on statin, educated on diet and exercise.  Needs repeat lipid panel in 3 months. He is anxious to discharge. He is stable and will discharge home at this time with instructions to follow up with PCP in one week. Rx for statin given. Continue current medication regimen. Started low cholesterol/fat diet + lifestyle changes.

## 2017-10-05 NOTE — DISCHARGE SUMMARY
"[facility] Hospital Medicine  Discharge Summary      Patient Name: Randall Armstrong  MRN: [id number]  Admission Date: 10/4/2017  Hospital Length of Stay: 0 days  Discharge Date and Time:  10/05/2017 1:48 PM  Attending Physician: Davida Estrada MD   Discharging Provider: ONESIMO Xie  Primary Care Provider: Den Moore MD      HPI:   Randall Armstrong is a 47 y.o. male with a history as below who presented to the ED with a CC of mid-sternal chest pain with associated left shoulder numbness. He reports he went to work this AM, developed a generalized HA ~ 0900, took advil without relief. States ~ 0930 he developed mid-sternal "stabbing/feel like someone sitting in my chest" chest pain 5/10. Reports the pain is continuous however, "eases up".  He reports the left upper arm/shoulder pain he reports "feeling like someone is squeezing my arm". Does not know mother/father history. Maternal grandmother (d) heart failure.  Found in ED to have non-ischemic EKG.  Initial troponin negative. BNP wnl. CXR is non-revealing.  Admitted to [facility] for ACS rule out.  No previous cardiac issues or recent w/u. He denies recurrent CP and is non-distressed.                                        * No surgery found *      Indwelling Lines/Drains at time of discharge:   Lines/Drains/Airways          No matching active lines, drains, or airways        Hospital Course:   Patient admitted for chest pain and ruled out ACS.  EKG is non ischemic. Serial troponin levels normal x 3. BNP normal. CXR without acute cardiothoracic processes. Echocardiogram performed and show EF 55%. NST performed and is without evidence of myocardial ischemia. Cardiology consulted and after evaluation has cleared the patient for discharge from a CV standpoint. Chest pain likely atypical in nature. Of note, found to have HLD, started on statin, educated on diet and exercise.  Needs repeat lipid panel in 3 months. He is anxious to discharge. " He is stable and will discharge home at this time with instructions to follow up with PCP in one week. Rx for statin given. Continue current medication regimen. Started low cholesterol/fat diet + lifestyle changes.          Consults:   Consults         Status Ordering Provider     Inpatient consult to Cardiology  Once     Provider:  Michele Gonzalez MD    Acknowledged CRISTOFER ROSALES          Significant Diagnostic Studies: Labs: All labs within the past 24 hours have been reviewed    Pending Diagnostic Studies:     Procedure Component Value Units Date/Time    NM Myocardial Perfusion Spect Multi Pharmacologic [[id number]] Resulted:  10/05/17 1018    Order Status:  Sent Lab Status:  In process Updated:  10/05/17 1202        Final Active Diagnoses:    Diagnosis Date Noted POA    PRINCIPAL PROBLEM:  Acute pain of left shoulder [M25.512] 10/04/2017 Yes    Chest pain [R07.9] 10/04/2017 Yes    Benign essential tremor [G25.0] 09/22/2017 Yes      Problems Resolved During this Admission:    Diagnosis Date Noted Date Resolved POA      No new Assessment & Plan notes have been filed under this hospital service since the last note was generated.  Service: Hospital Medicine      Discharged Condition: good    Disposition: Home or Self Care    Follow Up:  Follow-up Information     Den Moore MD.    Specialty:  Internal Medicine  Contact information:  [address] [place] [id number] [phone]                 Patient Instructions:     Diet general   Order Specific Question Answer Comments   Additional restrictions: Low Chol/Sat Fat      Activity as tolerated     Call MD for:  severe uncontrolled pain     Call MD for:  difficulty breathing or increased cough     Call MD for:  persistent dizziness, light-headedness, or visual disturbances     Call MD for:  increased confusion or weakness     Call MD for:  severe persistent headache       Medications:  Reconciled Home Medications:   Current Discharge Medication List       START taking these medications    Details   aspirin 81 MG Chew Take 1 tablet (81 mg total) by mouth once daily.  Refills: 0      atorvastatin (LIPITOR) 40 MG tablet Take 1 tablet (40 mg total) by mouth once daily.  Qty: 90 tablet, Refills: 3         CONTINUE these medications which have NOT CHANGED    Details   propranolol (INDERAL LA) 80 MG 24 hr capsule Take 1 capsule (80 mg total) by mouth once daily.  Qty: 30 capsule, Refills: 11    Associated Diagnoses: Benign essential tremor      fluticasone (FLONASE) 50 mcg/actuation nasal spray 1 spray by Each Nare route 2 (two) times daily.  Qty: 16 g, Refills: 1    Associated Diagnoses: URI (upper respiratory infection)           Time spent on the discharge of patient: 30 minutes      ONESIMO Xie  Department of Hospital Medicine  [facility]

## 2017-10-05 NOTE — SUBJECTIVE & OBJECTIVE
Past Medical History:   Diagnosis Date    Anxiety     Essential tremor        History reviewed. No pertinent surgical history.    Review of patient's allergies indicates:  No Known Allergies    No current facility-administered medications on file prior to encounter.      Current Outpatient Prescriptions on File Prior to Encounter   Medication Sig    propranolol (INDERAL LA) 80 MG 24 hr capsule Take 1 capsule (80 mg total) by mouth once daily.    fluticasone (FLONASE) 50 mcg/actuation nasal spray 1 spray by Each Nare route 2 (two) times daily.     Family History     Problem Relation (Age of Onset)    Cancer Mother    Heart disease Maternal Grandfather        Social History Main Topics    Smoking status: Never Smoker    Smokeless tobacco: Current User     Types: Chew    Alcohol use 3.0 - 3.6 oz/week     5 - 6 Cans of beer per week      Comment: on weekend only    Drug use: No    Sexual activity: Yes     Partners: Female     Review of Systems   Constitution: Negative for decreased appetite.   HENT: Negative for ear discharge.    Eyes: Negative for blurred vision.   Endocrine: Negative for polyphagia.   Skin: Negative for nail changes.   Neurological: Negative for aphonia.   Psychiatric/Behavioral: Negative for hallucinations.     Objective:     Vital Signs (Most Recent):  Temp: 98.1 °F (36.7 °C) (10/05/17 0748)  Pulse: (!) 58 (10/05/17 0748)  Resp: 18 (10/05/17 0748)  BP: 105/66 (10/05/17 0748)  SpO2: 97 % (10/05/17 0748) Vital Signs (24h Range):  Temp:  [97.6 °F (36.4 °C)-98.7 °F (37.1 °C)] 98.1 °F (36.7 °C)  Pulse:  [54-72] 58  Resp:  [16-18] 18  SpO2:  [95 %-99 %] 97 %  BP: (105-131)/(55-87) 105/66     Weight: 75.3 kg (166 lb 0.1 oz)  Body mass index is 30.36 kg/m².    SpO2: 97 %  O2 Device (Oxygen Therapy): room air      Intake/Output Summary (Last 24 hours) at 10/05/17 1032  Last data filed at 10/04/17 2300   Gross per 24 hour   Intake              120 ml   Output                0 ml   Net               120 ml       Lines/Drains/Airways     Peripheral Intravenous Line                 Peripheral IV - Single Lumen 10/04/17 1201 Right Antecubital less than 1 day                Physical Exam   Constitutional: He is oriented to person, place, and time. He appears well-developed and well-nourished.   HENT:   Head: Normocephalic and atraumatic.   Eyes: Conjunctivae are normal. Pupils are equal, round, and reactive to light.   Neck: Normal range of motion. Neck supple.   Cardiovascular: Normal rate, normal heart sounds and intact distal pulses.    Pulmonary/Chest: Effort normal and breath sounds normal.   Abdominal: Soft. Bowel sounds are normal.   Musculoskeletal: Normal range of motion.   Neurological: He is alert and oriented to person, place, and time.   Skin: Skin is warm and dry.       Significant Labs: All pertinent lab results from the last 24 hours have been reviewed.    Significant Imaging: X-Ray: CXR: X-Ray Chest 1 View (CXR): No results found for this visit on 10/04/17.

## 2017-10-21 DIAGNOSIS — F41.9 ANXIETY: ICD-10-CM

## 2017-10-21 RX ORDER — ESCITALOPRAM OXALATE 5 MG/1
TABLET ORAL
Qty: 30 TABLET | Refills: 1 | Status: SHIPPED | OUTPATIENT
Start: 2017-10-21 | End: 2017-10-30 | Stop reason: SDUPTHER

## 2017-10-30 ENCOUNTER — OFFICE VISIT (OUTPATIENT)
Dept: FAMILY MEDICINE | Facility: CLINIC | Age: 47
End: 2017-10-30
Payer: COMMERCIAL

## 2017-10-30 VITALS
SYSTOLIC BLOOD PRESSURE: 132 MMHG | HEART RATE: 60 BPM | WEIGHT: 154.75 LBS | RESPIRATION RATE: 15 BRPM | BODY MASS INDEX: 28.48 KG/M2 | HEIGHT: 62 IN | DIASTOLIC BLOOD PRESSURE: 86 MMHG | TEMPERATURE: 99 F | OXYGEN SATURATION: 99 %

## 2017-10-30 DIAGNOSIS — G25.0 BENIGN ESSENTIAL TREMOR: ICD-10-CM

## 2017-10-30 DIAGNOSIS — F41.9 ANXIETY: Primary | ICD-10-CM

## 2017-10-30 PROCEDURE — 99999 PR PBB SHADOW E&M-EST. PATIENT-LVL III: CPT | Mod: PBBFAC,,, | Performed by: INTERNAL MEDICINE

## 2017-10-30 PROCEDURE — 99214 OFFICE O/P EST MOD 30 MIN: CPT | Mod: S$GLB,,, | Performed by: INTERNAL MEDICINE

## 2017-10-30 RX ORDER — ESCITALOPRAM OXALATE 20 MG/1
20 TABLET ORAL DAILY
Qty: 30 TABLET | Refills: 2 | Status: SHIPPED | OUTPATIENT
Start: 2017-10-30 | End: 2017-12-01 | Stop reason: SDUPTHER

## 2017-10-30 NOTE — PROGRESS NOTES
"Subjective:       Patient ID: Randall Armstrong is a 47 y.o. male.    Chief Complaint: Follow-up    Discuss nerves    HPI: 48 y/o with essential tremor and anxiety presents for follow up after hospitalization for atypical chest pain. No further chest pain. Primary concern is stress related to two step children. He endoreses increase irritability decreased concentration depressed mood. On lexapro 5mg daily for > 1 year. Symptoms have worsened in last three months no SI/HI no manic episodes       Review of Systems   Constitutional: Negative for activity change, appetite change, fatigue, fever and unexpected weight change.   HENT: Negative for ear pain, rhinorrhea and sore throat.    Eyes: Negative for discharge and visual disturbance.   Respiratory: Negative for chest tightness, shortness of breath and wheezing.    Cardiovascular: Negative for chest pain, palpitations and leg swelling.   Gastrointestinal: Negative for abdominal pain, constipation and diarrhea.   Endocrine: Negative for cold intolerance and heat intolerance.   Genitourinary: Negative for dysuria and hematuria.   Musculoskeletal: Negative for joint swelling and neck stiffness.   Skin: Negative for rash.   Neurological: Negative for dizziness, syncope, weakness and headaches.   Psychiatric/Behavioral: Positive for agitation and dysphoric mood. Negative for suicidal ideas.       Objective:     Vitals:    10/30/17 1326 10/30/17 1350   BP: (!) 142/90 132/86   BP Location: Right arm    Patient Position: Sitting    BP Method: Small (Manual)    Pulse: 63 60   Resp: 15    Temp: 99 °F (37.2 °C)    TempSrc: Oral    SpO2: 99%    Weight: 70.2 kg (154 lb 12.2 oz)    Height: 5' 2" (1.575 m)           Physical Exam   Constitutional: He is oriented to person, place, and time. He appears well-developed and well-nourished.   HENT:   Head: Normocephalic and atraumatic.   Eyes: Conjunctivae are normal. Pupils are equal, round, and reactive to light.   Neck: Normal range of " motion.   Cardiovascular: Normal rate and regular rhythm.  Exam reveals no gallop and no friction rub.    No murmur heard.  Pulmonary/Chest: Effort normal and breath sounds normal. He has no wheezes. He has no rales.   Abdominal: Soft. Bowel sounds are normal. There is no tenderness. There is no rebound and no guarding.   Musculoskeletal: Normal range of motion. He exhibits no edema or tenderness.   Neurological: He is alert and oriented to person, place, and time. No cranial nerve deficit.   Skin: Skin is warm and dry.   Psychiatric: He has a normal mood and affect.       Assessment:       1. Anxiety    2. Benign essential tremor        Plan:    1. Increase lexapro follow up in four weeks    2. Stable on propranolol continue

## 2017-12-01 ENCOUNTER — OFFICE VISIT (OUTPATIENT)
Dept: FAMILY MEDICINE | Facility: CLINIC | Age: 47
End: 2017-12-01
Payer: COMMERCIAL

## 2017-12-01 VITALS
SYSTOLIC BLOOD PRESSURE: 110 MMHG | BODY MASS INDEX: 29.62 KG/M2 | DIASTOLIC BLOOD PRESSURE: 68 MMHG | RESPIRATION RATE: 16 BRPM | OXYGEN SATURATION: 97 % | HEART RATE: 79 BPM | HEIGHT: 62 IN | WEIGHT: 160.94 LBS | TEMPERATURE: 98 F

## 2017-12-01 DIAGNOSIS — F41.9 ANXIETY: ICD-10-CM

## 2017-12-01 DIAGNOSIS — G25.0 BENIGN ESSENTIAL TREMOR: ICD-10-CM

## 2017-12-01 DIAGNOSIS — E78.1 HYPERTRIGLYCERIDEMIA: Primary | ICD-10-CM

## 2017-12-01 PROCEDURE — 99214 OFFICE O/P EST MOD 30 MIN: CPT | Mod: S$GLB,,, | Performed by: INTERNAL MEDICINE

## 2017-12-01 PROCEDURE — 99999 PR PBB SHADOW E&M-EST. PATIENT-LVL III: CPT | Mod: PBBFAC,,, | Performed by: INTERNAL MEDICINE

## 2017-12-01 RX ORDER — ESCITALOPRAM OXALATE 20 MG/1
20 TABLET ORAL DAILY
Qty: 30 TABLET | Refills: 5 | Status: SHIPPED | OUTPATIENT
Start: 2017-12-01

## 2017-12-01 RX ORDER — PROPRANOLOL HYDROCHLORIDE 40 MG/1
40 TABLET ORAL 2 TIMES DAILY
Qty: 60 TABLET | Refills: 5 | Status: SHIPPED | OUTPATIENT
Start: 2017-12-01 | End: 2018-12-01

## 2017-12-01 NOTE — PROGRESS NOTES
"Subjective:       Patient ID: Randall Armstrong is a 47 y.o. male.    Chief Complaint: Follow-up (anxiety)    F/u mood    HPI: 46 y/o w/ essential tremor HLD and anxiety presents for follow up. Last visit lexapro was increased to 20mg daily feels this has siginifcantly improved his easy irritabilty feels less aggrevated at actions of step children. Reports extended release propranolol is very expensive asking for lower cost alternatives. Tremor currently well controlled      Review of Systems   Constitutional: Negative for activity change, fever and unexpected weight change.   HENT: Negative for congestion, rhinorrhea, sore throat and trouble swallowing.    Eyes: Negative for photophobia and redness.   Respiratory: Negative for cough, chest tightness, shortness of breath and wheezing.    Cardiovascular: Negative for chest pain, palpitations and leg swelling.   Gastrointestinal: Negative for abdominal pain, blood in stool, constipation, diarrhea, nausea and vomiting.   Endocrine: Negative for cold intolerance, heat intolerance and polyuria.   Genitourinary: Negative for decreased urine volume, difficulty urinating, dysuria and urgency.   Musculoskeletal: Negative for arthralgias and back pain.   Skin: Negative for rash.   Neurological: Negative for dizziness, syncope, weakness and headaches.   Psychiatric/Behavioral: Negative for dysphoric mood, sleep disturbance and suicidal ideas.       Objective:     Vitals:    12/01/17 1305   BP: 110/68   BP Location: Left arm   Patient Position: Sitting   BP Method: Large (Manual)   Pulse: 79   Resp: 16   Temp: 98.3 °F (36.8 °C)   TempSrc: Oral   SpO2: 97%   Weight: 73 kg (160 lb 15 oz)   Height: 5' 2" (1.575 m)          Physical Exam   Constitutional: He is oriented to person, place, and time. He appears well-developed and well-nourished.   HENT:   Head: Normocephalic and atraumatic.   Eyes: Conjunctivae are normal. Pupils are equal, round, and reactive to light.   Neck: Normal " range of motion.   Cardiovascular: Normal rate and regular rhythm.  Exam reveals no gallop and no friction rub.    No murmur heard.  Pulmonary/Chest: Effort normal and breath sounds normal. He has no wheezes. He has no rales.   Abdominal: Soft. Bowel sounds are normal. There is no tenderness. There is no rebound and no guarding.   Musculoskeletal: Normal range of motion. He exhibits no edema or tenderness.   Neurological: He is alert and oriented to person, place, and time. No cranial nerve deficit.   Skin: Skin is warm and dry.   Psychiatric: He has a normal mood and affect.       Assessment:       1. Hypertriglyceridemia    2. Anxiety    3. Benign essential tremor        Plan:    1. On statin repeat fasting lipids and lfts at follow up in four months    2. Improved continue current dose of lexapro    3. Switch to immediate release propranolol BID due to cost

## 2018-04-06 ENCOUNTER — APPOINTMENT (OUTPATIENT)
Dept: RADIOLOGY | Facility: HOSPITAL | Age: 48
End: 2018-04-06
Attending: INTERNAL MEDICINE
Payer: COMMERCIAL

## 2018-04-06 ENCOUNTER — OFFICE VISIT (OUTPATIENT)
Dept: FAMILY MEDICINE | Facility: CLINIC | Age: 48
End: 2018-04-06
Payer: COMMERCIAL

## 2018-04-06 VITALS
HEART RATE: 97 BPM | SYSTOLIC BLOOD PRESSURE: 132 MMHG | BODY MASS INDEX: 30.44 KG/M2 | TEMPERATURE: 99 F | HEIGHT: 62 IN | DIASTOLIC BLOOD PRESSURE: 88 MMHG | WEIGHT: 165.38 LBS | OXYGEN SATURATION: 96 %

## 2018-04-06 DIAGNOSIS — M79.641 PAIN OF RIGHT HAND: ICD-10-CM

## 2018-04-06 DIAGNOSIS — S62.364A CLOSED NONDISPLACED FRACTURE OF NECK OF FOURTH METACARPAL BONE OF RIGHT HAND, INITIAL ENCOUNTER: Primary | ICD-10-CM

## 2018-04-06 DIAGNOSIS — E78.5 HYPERLIPIDEMIA, UNSPECIFIED HYPERLIPIDEMIA TYPE: ICD-10-CM

## 2018-04-06 PROCEDURE — 73130 X-RAY EXAM OF HAND: CPT | Mod: TC,FY,PN,RT

## 2018-04-06 PROCEDURE — 73100 X-RAY EXAM OF WRIST: CPT | Mod: 26,RT,, | Performed by: RADIOLOGY

## 2018-04-06 PROCEDURE — 99214 OFFICE O/P EST MOD 30 MIN: CPT | Mod: S$GLB,,, | Performed by: INTERNAL MEDICINE

## 2018-04-06 PROCEDURE — 73130 X-RAY EXAM OF HAND: CPT | Mod: 26,RT,, | Performed by: RADIOLOGY

## 2018-04-06 PROCEDURE — 99999 PR PBB SHADOW E&M-EST. PATIENT-LVL IV: CPT | Mod: PBBFAC,,, | Performed by: INTERNAL MEDICINE

## 2018-04-06 PROCEDURE — 73100 X-RAY EXAM OF WRIST: CPT | Mod: TC,FY,PN,RT

## 2018-04-06 RX ORDER — NAPROXEN 500 MG/1
500 TABLET ORAL 2 TIMES DAILY
Qty: 30 TABLET | Refills: 0 | Status: SHIPPED | OUTPATIENT
Start: 2018-04-06

## 2018-04-06 NOTE — PROGRESS NOTES
"Subjective:       Patient ID: Randall Armstrong is a 48 y.o. male.    Chief Complaint: Hand Pain (right hand for 1 week)    Right hand pain    HPI: 49 y/o presents after dropping bucket on right hand two weeks ago. Swelling and pain tp posterior aspect since that time worse with wrist extension or griping objects no parathesia no motor weakness. He has not taken any medications for pain (no NSAIDs) no distal parathesia      Review of Systems   Constitutional: Negative for activity change, appetite change, fatigue, fever and unexpected weight change.   HENT: Negative for ear pain, rhinorrhea and sore throat.    Eyes: Negative for discharge and visual disturbance.   Respiratory: Negative for chest tightness, shortness of breath and wheezing.    Cardiovascular: Negative for chest pain, palpitations and leg swelling.   Gastrointestinal: Negative for abdominal pain, constipation and diarrhea.   Endocrine: Negative for cold intolerance and heat intolerance.   Genitourinary: Negative for dysuria and hematuria.   Musculoskeletal: Positive for joint swelling. Negative for neck stiffness.   Skin: Negative for rash.   Neurological: Negative for dizziness, syncope, weakness and headaches.   Psychiatric/Behavioral: Negative for suicidal ideas.       Objective:     Vitals:    04/06/18 1327 04/06/18 1359   BP: (!) 142/98 132/88   Pulse: 97    Temp: 99 °F (37.2 °C)    SpO2: 96%    Weight: 75 kg (165 lb 5.5 oz)    Height: 5' 2" (1.575 m)           Physical Exam   Constitutional: He is oriented to person, place, and time. He appears well-developed and well-nourished.   HENT:   Head: Normocephalic and atraumatic.   Eyes: Conjunctivae are normal. Pupils are equal, round, and reactive to light.   Neck: Normal range of motion.   Cardiovascular: Normal rate and regular rhythm.  Exam reveals no gallop and no friction rub.    No murmur heard.  Pulmonary/Chest: Effort normal and breath sounds normal. He has no wheezes. He has no rales. "   Abdominal: Soft. Bowel sounds are normal. There is no tenderness. There is no rebound and no guarding.   Musculoskeletal: He exhibits edema and tenderness.   Swelling abotu distal fourth and fifth metacarpal decreased wrist extension no snuff box tenderness to pain with lateral compression at wrist   Neurological: He is alert and oriented to person, place, and time. No cranial nerve deficit.   Skin: Skin is warm and dry.   Psychiatric: He has a normal mood and affect.     xr shows angulated non displaced fracture of fourth metacarpal  Assessment:       1. Closed nondisplaced fracture of neck of fourth metacarpal bone of right hand, initial encounter    2. Pain of right hand    3. Hyperlipidemia, unspecified hyperlipidemia type        Plan:    1/2. No evidence of instabity NSAID prn referal to hand for consideration of pinning if necessary    3. Needs repeat fasting lipid and lfts on statin

## 2020-10-05 ENCOUNTER — PATIENT MESSAGE (OUTPATIENT)
Dept: ADMINISTRATIVE | Facility: HOSPITAL | Age: 50
End: 2020-10-05

## 2020-10-20 ENCOUNTER — OCCUPATIONAL HEALTH (OUTPATIENT)
Dept: URGENT CARE | Facility: CLINIC | Age: 50
End: 2020-10-20

## 2020-10-20 DIAGNOSIS — Z02.1 PRE-EMPLOYMENT EXAMINATION: Primary | ICD-10-CM

## 2020-10-20 LAB
CTP QC/QA: YES
POC 10 PANEL DRUG SCREEN: NEGATIVE

## 2020-10-20 PROCEDURE — 80305 POCT RAPID DRUG SCREEN 10 PANEL: ICD-10-PCS | Mod: QW,S$GLB,, | Performed by: PHYSICIAN ASSISTANT

## 2020-10-20 PROCEDURE — 99080 OSHA QUESTIONNAIRE: ICD-10-PCS | Mod: S$GLB,,, | Performed by: PHYSICIAN ASSISTANT

## 2020-10-20 PROCEDURE — 92552 AUDIOGRAM OCC MED: ICD-10-PCS | Mod: S$GLB,,, | Performed by: PHYSICIAN ASSISTANT

## 2020-10-20 PROCEDURE — 99080 SPECIAL REPORTS OR FORMS: CPT | Mod: S$GLB,,, | Performed by: PHYSICIAN ASSISTANT

## 2020-10-20 PROCEDURE — 80305 DRUG TEST PRSMV DIR OPT OBS: CPT | Mod: QW,S$GLB,, | Performed by: PHYSICIAN ASSISTANT

## 2020-10-20 PROCEDURE — 99499 UNLISTED E&M SERVICE: CPT | Mod: S$GLB,,, | Performed by: PHYSICIAN ASSISTANT

## 2020-10-20 PROCEDURE — 99499 PHYSICAL, BASIC COMPLEXITY: ICD-10-PCS | Mod: S$GLB,,, | Performed by: PHYSICIAN ASSISTANT

## 2020-10-20 PROCEDURE — 92552 PURE TONE AUDIOMETRY AIR: CPT | Mod: S$GLB,,, | Performed by: PHYSICIAN ASSISTANT

## 2021-01-04 ENCOUNTER — PATIENT MESSAGE (OUTPATIENT)
Dept: ADMINISTRATIVE | Facility: HOSPITAL | Age: 51
End: 2021-01-04